# Patient Record
Sex: FEMALE | Race: WHITE | NOT HISPANIC OR LATINO | Employment: OTHER | URBAN - METROPOLITAN AREA
[De-identification: names, ages, dates, MRNs, and addresses within clinical notes are randomized per-mention and may not be internally consistent; named-entity substitution may affect disease eponyms.]

---

## 2020-06-03 NOTE — PATIENT DISCUSSION
Pt is interested in CL.  Unsure about dailies vs Mf, will call back and let us know so we can order samples above.  Pt requested to try SV in old reading glass Rx b/c she feels she sees well far away and close up with old glasses.  Order Air Optix or Dailies Aqua Comfort Plus in both Rx's and schedule I and R when lenses arrive.

## 2022-01-21 ENCOUNTER — PREPPED CHART (OUTPATIENT)
Dept: URBAN - METROPOLITAN AREA CLINIC 19 | Facility: CLINIC | Age: 62
End: 2022-01-21

## 2022-02-11 ENCOUNTER — FOLLOW UP (OUTPATIENT)
Dept: URBAN - METROPOLITAN AREA CLINIC 19 | Facility: CLINIC | Age: 62
End: 2022-02-11

## 2022-02-11 DIAGNOSIS — H52.4: ICD-10-CM

## 2022-02-11 DIAGNOSIS — H26.493: ICD-10-CM

## 2022-02-11 PROCEDURE — 92014 COMPRE OPH EXAM EST PT 1/>: CPT

## 2022-02-11 ASSESSMENT — KERATOMETRY
OD_K2POWER_DIOPTERS: 41.00
OD_AXISANGLE_DEGREES: 174
OS_AXISANGLE2_DEGREES: 87
OS_K1POWER_DIOPTERS: 39.00
OS_AXISANGLE_DEGREES: 177
OD_K1POWER_DIOPTERS: 40.25
OD_AXISANGLE2_DEGREES: 84
OS_K2POWER_DIOPTERS: 40.00

## 2022-02-11 ASSESSMENT — VISUAL ACUITY
OD_CC: 20/30
OS_CC: 20/25
OU_CC: 20/20

## 2022-02-18 ENCOUNTER — POST-OP (OUTPATIENT)
Dept: URBAN - METROPOLITAN AREA CLINIC 19 | Facility: CLINIC | Age: 62
End: 2022-02-18

## 2022-02-18 DIAGNOSIS — Z96.1: ICD-10-CM

## 2022-02-18 DIAGNOSIS — H26.493: ICD-10-CM

## 2022-02-18 PROCEDURE — 99024 POSTOP FOLLOW-UP VISIT: CPT

## 2022-02-18 RX ORDER — BROMFENAC SODIUM 0.7 MG/ML: 1 SOLUTION/ DROPS OPHTHALMIC AS NEEDED

## 2022-02-18 ASSESSMENT — KERATOMETRY
OS_AXISANGLE2_DEGREES: 82
OS_K2POWER_DIOPTERS: 39.75
OS_AXISANGLE_DEGREES: 172
OD_K1POWER_DIOPTERS: 40.25
OD_K2POWER_DIOPTERS: 40.50
OS_K1POWER_DIOPTERS: 39.00
OD_AXISANGLE2_DEGREES: 105
OD_AXISANGLE_DEGREES: 15

## 2022-02-18 ASSESSMENT — TONOMETRY
OS_IOP_MMHG: 11
OD_IOP_MMHG: 10

## 2022-02-18 ASSESSMENT — VISUAL ACUITY
OU_SC: 20/40
OS_PH: 20/30
OD_SC: 20/30-1
OS_SC: 20/40

## 2023-05-04 ENCOUNTER — ESTABLISHED PATIENT (OUTPATIENT)
Dept: URBAN - METROPOLITAN AREA CLINIC 19 | Facility: CLINIC | Age: 63
End: 2023-05-04

## 2023-05-04 DIAGNOSIS — H26.493: ICD-10-CM

## 2023-05-04 DIAGNOSIS — H52.4: ICD-10-CM

## 2023-05-04 PROCEDURE — 92014 COMPRE OPH EXAM EST PT 1/>: CPT

## 2023-05-04 PROCEDURE — 92015 DETERMINE REFRACTIVE STATE: CPT

## 2023-05-04 ASSESSMENT — VISUAL ACUITY
OD_CC: 20/30-2
OU_CC: J1+
OS_CC: 20/25-2

## 2023-05-04 ASSESSMENT — KERATOMETRY
OS_AXISANGLE_DEGREES: 172
OD_AXISANGLE_DEGREES: 15
OD_K2POWER_DIOPTERS: 40.50
OD_K1POWER_DIOPTERS: 40.25
OS_K1POWER_DIOPTERS: 39.00
OD_AXISANGLE2_DEGREES: 105
OS_K2POWER_DIOPTERS: 39.75
OS_AXISANGLE2_DEGREES: 82

## 2023-05-04 ASSESSMENT — TONOMETRY
OS_IOP_MMHG: 9
OD_IOP_MMHG: 9

## 2024-05-06 ENCOUNTER — ESTABLISHED PATIENT (OUTPATIENT)
Dept: URBAN - METROPOLITAN AREA CLINIC 19 | Facility: CLINIC | Age: 64
End: 2024-05-06

## 2024-05-06 DIAGNOSIS — H16.223: ICD-10-CM

## 2024-05-06 DIAGNOSIS — H26.493: ICD-10-CM

## 2024-05-06 DIAGNOSIS — H43.813: ICD-10-CM

## 2024-05-06 DIAGNOSIS — H52.4: ICD-10-CM

## 2024-05-06 PROCEDURE — 92014 COMPRE OPH EXAM EST PT 1/>: CPT

## 2024-05-06 PROCEDURE — 92015 DETERMINE REFRACTIVE STATE: CPT

## 2024-05-06 ASSESSMENT — KERATOMETRY
OD_AXISANGLE2_DEGREES: 113
OS_K2POWER_DIOPTERS: 39.25
OD_AXISANGLE_DEGREES: 23
OS_AXISANGLE_DEGREES: 35
OD_K1POWER_DIOPTERS: 40.75
OS_K1POWER_DIOPTERS: 39.75
OD_K2POWER_DIOPTERS: 41.25
OS_AXISANGLE2_DEGREES: 125

## 2024-05-06 ASSESSMENT — VISUAL ACUITY
OS_CC: 20/25-3
OU_CC: 20/20
OD_CC: 20/30

## 2024-05-06 ASSESSMENT — TONOMETRY
OS_IOP_MMHG: 10
OD_IOP_MMHG: 10

## 2024-06-27 ENCOUNTER — ESTABLISHED PATIENT (OUTPATIENT)
Dept: URBAN - METROPOLITAN AREA CLINIC 20 | Facility: CLINIC | Age: 64
End: 2024-06-27

## 2024-06-27 DIAGNOSIS — H16.223: ICD-10-CM

## 2024-06-27 PROCEDURE — 99213 OFFICE O/P EST LOW 20 MIN: CPT

## 2024-06-27 PROCEDURE — 68761 CLOSE TEAR DUCT OPENING: CPT

## 2024-06-27 ASSESSMENT — KERATOMETRY
OS_K2POWER_DIOPTERS: 39.25
OS_AXISANGLE2_DEGREES: 125
OD_AXISANGLE2_DEGREES: 113
OD_K1POWER_DIOPTERS: 40.75
OS_AXISANGLE_DEGREES: 35
OD_AXISANGLE_DEGREES: 23
OD_K2POWER_DIOPTERS: 41.25
OS_K1POWER_DIOPTERS: 39.75

## 2024-06-27 ASSESSMENT — VISUAL ACUITY
OD_CC: 20/25
OS_CC: 20/25

## 2024-06-27 ASSESSMENT — TONOMETRY
OD_IOP_MMHG: 11
OS_IOP_MMHG: 12

## 2025-08-27 ENCOUNTER — COMPREHENSIVE EXAM (OUTPATIENT)
Age: 65
End: 2025-08-27

## 2025-08-27 DIAGNOSIS — H43.813: ICD-10-CM

## 2025-08-27 DIAGNOSIS — H16.223: ICD-10-CM

## 2025-08-27 DIAGNOSIS — H52.4: ICD-10-CM

## 2025-08-27 DIAGNOSIS — H26.493: ICD-10-CM

## 2025-08-27 PROCEDURE — 92014 COMPRE OPH EXAM EST PT 1/>: CPT

## 2025-08-27 PROCEDURE — 92015 DETERMINE REFRACTIVE STATE: CPT
